# Patient Record
(demographics unavailable — no encounter records)

---

## 2024-11-05 NOTE — VITALS
[Maximal Pain Intensity: 0/10] : 0/10 [Least Pain Intensity: 0/10] : 0/10 [90: Able to carry normal activity; minor signs or symptoms of disease.] : 90: Able to carry normal activity; minor signs or symptoms of disease.  [ECOG Performance Status: 0 - Fully active, able to carry on all pre-disease performance without restriction] : Performance Status: 0 - Fully active, able to carry on all pre-disease performance without restriction Parent(s)/Grandparent(s)

## 2024-11-11 NOTE — HISTORY OF PRESENT ILLNESS
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  7/15/2024 CT Chest: IMPRESSION: No evidence of intrathoracic metastatic disease.  7/15/2024 MRI Right Upper Ext.: IMPRESSION: 1.  Decreasing ill-defined edema involving the deltoid, triceps, and brachioradialis muscles. These are favored to represent posttreatment change. 2.  No MR evidence of tumor recurrence. 3.  Continued imaging surveillance is recommended.  8/05/24 Dermatology Dr. Leggett Right forearm,  shave biopsy  - Subacute spongiotic dermatitis with rare eosinophils, and features of lichen simplex chronicus. Findings are compatible with nummular dermatitis.   10/28/24 MRI OF THE RIGHT UPPER EXTREMITY: IMPRESSION: Stable postsurgical/posttreatment changes along the posterolateral margin of right mid to distal upper arm without MR evidence for locoregional sarcoma recurrence.  10/28/24 CT Chest - IMPRESSION: No evidence of thoracic metastasis  11/11/24 Presents for follow up. He states that he is doing well. Continues skin care and physical therapy.

## 2024-11-11 NOTE — REVIEW OF SYSTEMS
[Fatigue: Grade 0] : Fatigue: Grade 0 [Pruritus: Grade 0] : Pruritus: Grade 0 [Skin Atrophy: Grade 0] : Skin Atrophy: Grade 0 [Skin Hyperpigmentation: Grade 1 - Hyperpigmentation covering <10% BSA; no psychosocial impact] : Skin Hyperpigmentation: Grade 1 - Hyperpigmentation covering <10% BSA; no psychosocial impact [Dermatitis Radiation: Grade 0] : Dermatitis Radiation: Grade 0 [Skin Hyperpigmentation: Grade 0] : Skin Hyperpigmentation: Grade 0

## 2024-11-15 NOTE — HISTORY OF PRESENT ILLNESS
[FreeTextEntry1] : Mr. FUNG is a 69 year-year-old White M who comes today to clinic for urinary symptoms. Endorsing moderate to severe LUTS for the last 5 years despite taking tamsulosin 0.4mg (see IPSS), both voiding and storage symptoms.  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 Cousin diagnosed with metastatic prostate cancer at age 65.  11/15/24 LUTS without improvement despite tamsulosin 0.8 mg Uroflow obstructive,  cc.   Prostate 65cc, high bladder neck (6cm) Denies fevers, chills, flank pain, hematuria, AUR.

## 2024-11-15 NOTE — PHYSICAL EXAM
[Normal Appearance] : normal appearance [Well Groomed] : well groomed [General Appearance - In No Acute Distress] : no acute distress [] : no respiratory distress [Respiration, Rhythm And Depth] : normal respiratory rhythm and effort [Exaggerated Use Of Accessory Muscles For Inspiration] : no accessory muscle use [Normal Station and Gait] : the gait and station were normal for the patient's age [Oriented To Time, Place, And Person] : oriented to person, place, and time [Affect] : the affect was normal [Mood] : the mood was normal [No Focal Deficits] : no focal deficits

## 2024-11-15 NOTE — ASSESSMENT
[FreeTextEntry1] : 69-year-old male with BPH/LUTS despite tamsulosin 0.8 mg.  Discussed with the patient surgical alternatives, patient would like to observe for now. Follow-up uroflow PVR IPSS.  Because the patient has continued to experience symptoms despite medical management, we discussed the different surgical options:  including transurethral resection of the prostate, Greenlight laser prostatectomy, Minimally invasive surgical therapies like Urolift and Rezum, PAE, and simple prostatectomy. We discussed the risks including bleeding, infection, damage to the urethra, bladder and ureteral orifices, scar tissue, and leaking (either due to injuring the sphincter or lowering bladder outlet obstruction with concomitant DO). The patient is not planning on having any additional children and also understands that retrograde ejaculation is also a risk of these procedures. This can be permanent. All TURP procedures can cause short or long term dysuria and intermittent hematuria. The patient understands that some of these complications are reversible while some are not and may require additional procedures. He understands that the prostate grows throughout life and this procedure does not guarantee that he will not need a future procedure.

## 2024-11-15 NOTE — SIGNATURES
[TextEntry] : Nick Kaminski M.D. Minimally Invasive and Robotic Urologic Surgery [facility] | [facility]  of Urology [employer] at [facility] Tel: [phone] | Fax: [phone] | email: [email or web address]

## 2025-02-21 NOTE — HISTORY OF PRESENT ILLNESS
[FreeTextEntry1] : Mr. FUNG is a 69 year-year-old White M who comes today to clinic for urinary symptoms. Endorsing moderate to severe LUTS for the last 5 years despite taking tamsulosin 0.4mg (see IPSS), both voiding and storage symptoms.  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 Cousin diagnosed with metastatic prostate cancer at age 65.  2/21/25 Going for BPH follow-up LUTS improved with tamsulosin 0.8 mg PVR 45 cc.   Prostate 65cc, high bladder neck (6cm) Denies fevers, chills, flank pain, hematuria, AUR.

## 2025-02-21 NOTE — SIGNATURES
[TextEntry] : Nick Kaminski M.D. Minimally Invasive and Robotic Urologic Surgery [facility] | [facility]  of Urology [employer] at [facility] Tel: [phone] | Fax: [phone] | email: [email or web address]

## 2025-02-21 NOTE — ASSESSMENT
[FreeTextEntry1] : 70-year-old male with BPH/LUTS, symptoms improved on tamsulosin 0.8 mg.  Patient wishes to continue. Follow-up uroflow PVR IPSS.  We discussed the treatment options for BPH. We generally start with medical management and proceed to surgery if necessary. Alpha blockers act to relax the prostate while 5-alpha reductase inhibitors (5-Drew) shrink the prostate. 5-KARIME's are most effective in patients who have prostates that are >40gm. The former are fairly fast acting (in a matter of days-weeks) while the latter can take up to 3-6 months to take effect. PDE5 inhibitors have been shown in several randomized trials to be beneficial in improving symptom scores in patients with LUTS/BPH.

## 2025-04-02 NOTE — ASSESSMENT
[Vaccines Reviewed] : Immunizations reviewed today. Please see immunization details in the vaccine log within the immunization flowsheet.  [FreeTextEntry1] : *Right upper extremity high grade3 myxoid sarcoma s/p resection 5/11/2023 and 3 months XBRT,now on surveillance imaging quarterly. PET scan negative for distal disease 7/2023, chest CT without evidence of malignancy 1/2024. CT chest and arm MRI 10/2024 negative for recurrent disease. Surveillance scan 5/2025.  *Right scapular lipoma.Not sufficiently troubled to desire resection  *Pure hypercholesterolemia. Pooled cohort equation assuming average blood pressure 132/80 confers a 12.1% 10-year risk of M ACE for which intermediate intensity lipid-lowering therapy recommended and started with great response, cut LDL by 2/3. Plan continue (ALT WNL).  *Vitamin D deficiency. He reports 2000 IU daily with reasonable vitamin D levels in the upper 30s, previously low 20s.  *LUTS.Sees Dr Crawley/urologist. Has seen improvement in nocturia with uptitration of his Flomax, currently2-3at night. Prophylactically voids before bedtime. Asked to move flomax to 2hrs before bedtime to provide better bedtime emptying.  *Routine adult health maintenance Vaccinations: Tdap4/2024, lttr provided and Prevnar 20 today given CA hx and age >65. Shingrix completed 2020. Up-to-date flu and COVID boosters. Colon cancer screening: No family history colorectal cancer. No adenomas April 2022. Reportedly 2 negative colonoscopies prior to his third, most recent colonoscopy. Would space to 7 to 10 years, i.e. [id number]. Patient would be 77 in 2032.  Prostate cancer screening: Stable PSA around 1 since [facility]. Update AAA screening: PET/CT 2023 negative for AAA. HCV, HIV nonreactive 2024. Screens for anemia thyroid disease liver and kidney disease diabetes elevated lipoprotein a all returned normal/negative, 2024. Repeat relevant labs now. It was a pleasure to visit with Mr. Samuels today. Answered all questions as best I could. Disposition portal results. f/u 1 yr. [id number]

## 2025-04-02 NOTE — PHYSICAL EXAM
[No Acute Distress] : no acute distress [Well Nourished] : well nourished [Well Developed] : well developed [Well-Appearing] : well-appearing [Normal Sclera/Conjunctiva] : normal sclera/conjunctiva [PERRL] : pupils equal round and reactive to light [EOMI] : extraocular movements intact [Normal Outer Ear/Nose] : the outer ears and nose were normal in appearance [Normal Oropharynx] : the oropharynx was normal [No JVD] : no jugular venous distention [No Lymphadenopathy] : no lymphadenopathy [Supple] : supple [Thyroid Normal, No Nodules] : the thyroid was normal and there were no nodules present [No Respiratory Distress] : no respiratory distress  [No Accessory Muscle Use] : no accessory muscle use [Clear to Auscultation] : lungs were clear to auscultation bilaterally [Normal Rate] : normal rate  [Regular Rhythm] : with a regular rhythm [Normal S1, S2] : normal S1 and S2 [No Murmur] : no murmur heard [No Carotid Bruits] : no carotid bruits [No Abdominal Bruit] : a ~M bruit was not heard ~T in the abdomen [No Varicosities] : no varicosities [Pedal Pulses Present] : the pedal pulses are present [No Edema] : there was no peripheral edema [No Palpable Aorta] : no palpable aorta [No Extremity Clubbing/Cyanosis] : no extremity clubbing/cyanosis [Soft] : abdomen soft [Non Tender] : non-tender [Non-distended] : non-distended [No Masses] : no abdominal mass palpated [No HSM] : no HSM [Normal Bowel Sounds] : normal bowel sounds [Normal Posterior Cervical Nodes] : no posterior cervical lymphadenopathy [Normal Anterior Cervical Nodes] : no anterior cervical lymphadenopathy [No CVA Tenderness] : no CVA  tenderness [No Spinal Tenderness] : no spinal tenderness [No Joint Swelling] : no joint swelling [Grossly Normal Strength/Tone] : grossly normal strength/tone [No Rash] : no rash [Coordination Grossly Intact] : coordination grossly intact [No Focal Deficits] : no focal deficits [Normal Gait] : normal gait [Deep Tendon Reflexes (DTR)] : deep tendon reflexes were 2+ and symmetric [Normal Affect] : the affect was normal [Normal Insight/Judgement] : insight and judgment were intact [de-identified] : B fifth toes externally rotated, stably so. [de-identified] : Interscapular neurofibroma, diffuse SKs

## 2025-04-02 NOTE — HISTORY OF PRESENT ILLNESS
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  He has no acute concerns. Looking forward to warmer weather, to lose some of his winter weight.

## 2025-04-03 NOTE — PHYSICAL EXAM
[Alert] : alert [Oriented x 3] : ~L oriented x 3 [Well Nourished] : well nourished [Conjunctiva Non-injected] : conjunctiva non-injected [No Visual Lymphadenopathy] : no visual  lymphadenopathy [No Clubbing] : no clubbing [No Edema] : no edema [No Bromhidrosis] : no bromhidrosis [No Chromhidrosis] : no chromhidrosis [FreeTextEntry3] : The patient is well-appearing, in no acute distress, alert and oriented x 3. Mood and affect are normal. A complete cutaneous examination of the scalp, face, neck, chest, abdomen, back, bilateral arms, bilateral legs, buttocks, digits, nails, eyelids, conjunctiva and lips reveals the following significant findings: - Well-healed scar on the R upper arm without nodularity - No axillary or cervical LAD -Tan to dark brown stuck-on plaques on the trunk and extremities

## 2025-04-03 NOTE — HISTORY OF PRESENT ILLNESS
[FreeTextEntry1] : spots on the skin [de-identified] : 70M with a hx of myxofibrosarcoma of the R upper arm s/p excision (4/2023) and XRT here for skin check. Notes growths on the trunk and extremities. Otherwise, no new, evolving, or symptomatic skin lesions.

## 2025-05-12 NOTE — REVIEW OF SYSTEMS
[Fatigue: Grade 0] : Fatigue: Grade 0 [Pruritus: Grade 0] : Pruritus: Grade 0 [Skin Atrophy: Grade 0] : Skin Atrophy: Grade 0 [Skin Hyperpigmentation: Grade 0] : Skin Hyperpigmentation: Grade 0 [Dermatitis Radiation: Grade 0] : Dermatitis Radiation: Grade 0 [Negative] : Allergic/Immunologic

## 2025-05-14 NOTE — HISTORY OF PRESENT ILLNESS
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  7/15/2024 CT Chest: IMPRESSION: No evidence of intrathoracic metastatic disease.  7/15/2024 MRI Right Upper Ext.: IMPRESSION: 1.  Decreasing ill-defined edema involving the deltoid, triceps, and brachioradialis muscles. These are favored to represent posttreatment change. 2.  No MR evidence of tumor recurrence. 3.  Continued imaging surveillance is recommended.  8/05/24 Dermatology Dr. Leggett Right forearm, shave biopsy - Subacute spongiotic dermatitis with rare eosinophils, and features of lichen simplex chronicus. Findings are compatible with nummular dermatitis.   5/01/25 CT Chest IMPRESSION: No evidence of thoracic metastasis.  5/01/25 MRI right upper extremity  showed: Posttreatment changes are again seen in the upper extremity, similar in appearance to prior imaging. On subtraction images, there is a rounded focus without corresponding abnormality on additional sequences (22:46), which is likely artifactual. No definite evidence of recurrence. Attention on follow-up examinations is recommended.  5/12/2025- Mr. Samuels presents today for follow up. Continues skin care and physical therapy. Overall doing well. He has no pain in his arm but gets massages weekly.  Saw Dr. Deleon on 4/3/2025, no evidence of recurrence

## 2025-05-14 NOTE — PHYSICAL EXAM
[Normal] : well developed, well nourished, in no acute distress [] : no respiratory distress [Respiration, Rhythm And Depth] : normal respiratory rhythm and effort [Exaggerated Use Of Accessory Muscles For Inspiration] : no accessory muscle use [de-identified] : right upper extremity with surgical scar, no edema,

## 2025-05-14 NOTE — HISTORY OF PRESENT ILLNESS
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  7/15/2024 CT Chest: IMPRESSION: No evidence of intrathoracic metastatic disease.  7/15/2024 MRI Right Upper Ext.: IMPRESSION: 1.  Decreasing ill-defined edema involving the deltoid, triceps, and brachioradialis muscles. These are favored to represent posttreatment change. 2.  No MR evidence of tumor recurrence. 3.  Continued imaging surveillance is recommended.  8/05/24 Dermatology Dr. Leggett Right forearm, shave biopsy - Subacute spongiotic dermatitis with rare eosinophils, and features of lichen simplex chronicus. Findings are compatible with nummular dermatitis.   5/01/25 CT Chest IMPRESSION: No evidence of thoracic metastasis.  5/01/25 MRI right upper extremity  showed: Posttreatment changes are again seen in the upper extremity, similar in appearance to prior imaging. On subtraction images, there is a rounded focus without corresponding abnormality on additional sequences (22:46), which is likely artifactual. No definite evidence of recurrence. Attention on follow-up examinations is recommended.  5/12/2025- Mr. Samuels presents today for follow up. Continues skin care and physical therapy. Overall doing well. He has no pain in his arm but gets massages weekly.  Saw Dr. Deleon on 4/3/2025, no evidence of recurrence

## 2025-05-14 NOTE — PHYSICAL EXAM
[Normal] : well developed, well nourished, in no acute distress [] : no respiratory distress [Respiration, Rhythm And Depth] : normal respiratory rhythm and effort [Exaggerated Use Of Accessory Muscles For Inspiration] : no accessory muscle use [de-identified] : right upper extremity with surgical scar, no edema,

## 2025-05-14 NOTE — PHYSICAL EXAM
[Normal] : well developed, well nourished, in no acute distress [] : no respiratory distress [Respiration, Rhythm And Depth] : normal respiratory rhythm and effort [Exaggerated Use Of Accessory Muscles For Inspiration] : no accessory muscle use [de-identified] : right upper extremity with surgical scar, no edema,

## 2025-05-14 NOTE — HISTORY OF PRESENT ILLNESS
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  7/15/2024 CT Chest: IMPRESSION: No evidence of intrathoracic metastatic disease.  7/15/2024 MRI Right Upper Ext.: IMPRESSION: 1.  Decreasing ill-defined edema involving the deltoid, triceps, and brachioradialis muscles. These are favored to represent posttreatment change. 2.  No MR evidence of tumor recurrence. 3.  Continued imaging surveillance is recommended.  8/05/24 Dermatology Dr. Leggett Right forearm, shave biopsy - Subacute spongiotic dermatitis with rare eosinophils, and features of lichen simplex chronicus. Findings are compatible with nummular dermatitis.   5/01/25 CT Chest IMPRESSION: No evidence of thoracic metastasis.  5/01/25 MRI right upper extremity  showed: Posttreatment changes are again seen in the upper extremity, similar in appearance to prior imaging. On subtraction images, there is a rounded focus without corresponding abnormality on additional sequences (22:46), which is likely artifactual. No definite evidence of recurrence. Attention on follow-up examinations is recommended.  5/12/2025- Mr. Samuels presents today for follow up. Continues skin care and physical therapy. Overall doing well. He has no pain in his arm but gets massages weekly.  Saw Dr. Deleon on 4/3/2025, no evidence of recurrence

## 2025-05-23 NOTE — SIGNATURES
[TextEntry] : Nick Kaminski M.D. Minimally Invasive and Robotic Urologic Surgery [facility] | Health system  of Urology [employer] at [facility] Tel: [phone] | Fax: [phone] | email: [email or web address]

## 2025-05-23 NOTE — SIGNATURES
[TextEntry] : Nick Kaminski M.D. Minimally Invasive and Robotic Urologic Surgery [facility] | [facility]  of Urology [employer] at [facility] Tel: [phone] | Fax: [phone] | email: [email or web address]

## 2025-05-23 NOTE — ASSESSMENT
[FreeTextEntry1] : 70-year-old male with BPH/LUTS. Patient wishes to continue with current medical therapy. Follow-up uroflow PVR IPSS. We discussed the treatment options for BPH. We generally start with medical management and proceed to surgery if necessary. Alpha blockers act to relax the prostate. If medical management fails, the patient cannot wait for medical management to work, or the patient has NEVAEH caused by retention, he may consider surgery.   PSA screening--PSA April 2026.

## 2025-05-23 NOTE — HISTORY OF PRESENT ILLNESS
[FreeTextEntry1] : Mr. FUNG is a 70-year-old White M who comes for fu. 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 Cousin diagnosed with metastatic prostate cancer at age 65.  LUTS Somewhat improved with tamsulosin 0.8 mg PSA 0.8 April 2025. Prostate 65cc, high bladder neck (6cm) Denies fevers, chills, flank pain, hematuria, AUR.